# Patient Record
Sex: MALE | Race: OTHER | HISPANIC OR LATINO | URBAN - METROPOLITAN AREA
[De-identification: names, ages, dates, MRNs, and addresses within clinical notes are randomized per-mention and may not be internally consistent; named-entity substitution may affect disease eponyms.]

---

## 2018-02-07 ENCOUNTER — APPOINTMENT (OUTPATIENT)
Age: 43
Setting detail: DERMATOLOGY
End: 2018-02-22

## 2018-02-07 DIAGNOSIS — L73.0 ACNE KELOID: ICD-10-CM

## 2018-02-07 PROCEDURE — OTHER PRESCRIPTION: OTHER

## 2018-02-07 PROCEDURE — OTHER COUNSELING: OTHER

## 2018-02-07 PROCEDURE — OTHER MIPS QUALITY: OTHER

## 2018-02-07 PROCEDURE — 99202 OFFICE O/P NEW SF 15 MIN: CPT

## 2018-02-07 PROCEDURE — OTHER OTHER: OTHER

## 2018-02-07 RX ORDER — CLINDAMYCIN PHOSPHATE 10 MG/ML
SOLUTION TOPICAL
Qty: 1 | Refills: 6 | Status: ERX | COMMUNITY
Start: 2018-02-07

## 2018-02-07 ASSESSMENT — LOCATION ZONE DERM: LOCATION ZONE: SCALP

## 2018-02-07 ASSESSMENT — LOCATION SIMPLE DESCRIPTION DERM: LOCATION SIMPLE: POSTERIOR SCALP

## 2018-02-07 ASSESSMENT — LOCATION DETAILED DESCRIPTION DERM: LOCATION DETAILED: MID-OCCIPITAL SCALP

## 2018-02-07 NOTE — PROCEDURE: OTHER
Other (Free Text): Discussed shave biopsies and IL injections. If we proceed with injections would wish to do biopsy first.
Note Text (......Xxx Chief Complaint.): This diagnosis correlates with the
Detail Level: Simple